# Patient Record
Sex: FEMALE | Race: WHITE | NOT HISPANIC OR LATINO | Employment: OTHER | URBAN - METROPOLITAN AREA
[De-identification: names, ages, dates, MRNs, and addresses within clinical notes are randomized per-mention and may not be internally consistent; named-entity substitution may affect disease eponyms.]

---

## 2017-02-21 ENCOUNTER — OFFICE VISIT (OUTPATIENT)
Dept: MEDICAL GROUP | Facility: MEDICAL CENTER | Age: 37
End: 2017-02-21

## 2017-02-21 VITALS
WEIGHT: 154.1 LBS | DIASTOLIC BLOOD PRESSURE: 64 MMHG | HEART RATE: 86 BPM | BODY MASS INDEX: 24.77 KG/M2 | RESPIRATION RATE: 16 BRPM | OXYGEN SATURATION: 96 % | HEIGHT: 66 IN | SYSTOLIC BLOOD PRESSURE: 96 MMHG | TEMPERATURE: 98.2 F

## 2017-02-21 DIAGNOSIS — R53.83 FATIGUE, UNSPECIFIED TYPE: ICD-10-CM

## 2017-02-21 DIAGNOSIS — F41.9 ANXIETY AND DEPRESSION: ICD-10-CM

## 2017-02-21 DIAGNOSIS — R00.2 PALPITATIONS: ICD-10-CM

## 2017-02-21 DIAGNOSIS — F32.A ANXIETY AND DEPRESSION: ICD-10-CM

## 2017-02-21 PROCEDURE — 99204 OFFICE O/P NEW MOD 45 MIN: CPT | Performed by: PHYSICIAN ASSISTANT

## 2017-02-21 RX ORDER — PROPRANOLOL HYDROCHLORIDE 10 MG/1
TABLET ORAL
Qty: 90 TAB | Refills: 5 | Status: SHIPPED | OUTPATIENT
Start: 2017-02-21

## 2017-02-21 RX ORDER — VENLAFAXINE 37.5 MG/1
37.5 TABLET ORAL 2 TIMES DAILY
Qty: 60 TAB | Refills: 2 | Status: SHIPPED | OUTPATIENT
Start: 2017-02-21 | End: 2017-05-19 | Stop reason: SDUPTHER

## 2017-02-21 RX ORDER — PROPRANOLOL HYDROCHLORIDE 20 MG/1
20 TABLET ORAL 3 TIMES DAILY
COMMUNITY

## 2017-02-21 RX ORDER — ESCITALOPRAM OXALATE 10 MG/1
10 TABLET ORAL DAILY
COMMUNITY

## 2017-02-21 RX ORDER — PROPRANOLOL HYDROCHLORIDE 10 MG/1
10 TABLET ORAL 3 TIMES DAILY
COMMUNITY
End: 2017-02-21 | Stop reason: SDUPTHER

## 2017-02-21 RX ORDER — VENLAFAXINE HYDROCHLORIDE 37.5 MG/1
37.5 CAPSULE, EXTENDED RELEASE ORAL DAILY
Qty: 30 CAP | Refills: 3 | Status: CANCELLED | OUTPATIENT
Start: 2017-02-21

## 2017-02-21 ASSESSMENT — PATIENT HEALTH QUESTIONNAIRE - PHQ9
SUM OF ALL RESPONSES TO PHQ QUESTIONS 1-9: 17
CLINICAL INTERPRETATION OF PHQ2 SCORE: 6
5. POOR APPETITE OR OVEREATING: 0 - NOT AT ALL

## 2017-02-21 NOTE — PROGRESS NOTES
Chief Complaint   Patient presents with   • New Patient   • Depression     HISTORY OF THE PRESENT ILLNESS: This is a 36 y.o. female new patient to our practice. This pleasant patient is here today to establish care.     Anxiety and depression  Patient states that this initially started approximately 9 years ago after the birth of her first child. Patient states that she had postpartum depression after the birth of her son and was on Lexapro for approximately 1.5 years. Patient states that she was able to gain control and therefore's discontinue the medication. Patient states she was doing fairly stable and controlled up until approximately 1+ year after the birth of her daughter 5 years ago where she started experiencing postpartum depression yet again. As a result patient went back on Lexapro and did take For less than one year. Patient states that she did wean herself off. Patient states that she was doing a lot of natural herbal supplements such as Seth's wort's, 5 HTP, ELIO-E, etc. Patient states that she was doing well and controlled up until approximately last summer. 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 Patient states that she noticed in the summertime severity of her anxiety, panic attacks. Patient states that she's been experiencing increased panic attacks, palpitations, reflux, and fatigue. Patient states while in [place] for work she does see a primary care out there who did start patient back on Lexapro 10 mg. Patient states anxiety also severe.  With anxiety had elevated HR.  PCP in [place] prescribed for elevated heart rate/anxiety propanolol. Patient was taking 10 mg in the morning if necessary as needed as well as 20 mg at nighttime, once again as needed. Patient states it does help with sleep.  Patient has tried increasing Lexapro to 20 which causes severe side effects.      Past Medical History    Diagnosis Date   • AR (allergic rhinitis)    • Anxiety and depression        Past Surgical History   Procedure Laterality Date   • Appendectomy     • Primary c section  2007   • Cyst excision     • Primary c section  2011   • Salpingo-oophorectomy, unilateral     • Hernia repair  03/2015     [facility]   • Vein ligation radio frequency       No family status information on file.     Family History   Problem Relation Age of Onset   • Anxiety disorder Mother    • Depression Mother      Social History   Substance Use Topics   • Smoking status: Former Smoker   • Smokeless tobacco: Never Used   • Alcohol Use: 0.0 oz/week     0 Standard drinks or equivalent per week      Comment: Occasionally - weekends     Allergies: Penicillins    Current Outpatient Prescriptions Ordered in [facility]   Medication Sig Dispense Refill   • escitalopram (LEXAPRO) 10 MG Tab Take 10 mg by mouth every day.     • propranolol (INDERAL) 20 MG Tab Take 20 mg by mouth 3 times a day.     • propranolol (INDERAL) 10 MG Tab i po qam and ii po qhs prn anxiety 90 Tab 5   • venlafaxine (EFFEXOR) 37.5 MG Tab Take 1 Tab by mouth 2 Times a Day. 60 Tab 2     No current Epic-ordered facility-administered medications on file.     Review of Systems   Constitutional: Negative for fever, chills, weight loss and positive malaise/fatigue.   HENT: Negative for ear pain, nosebleeds, congestion, sore throat and neck pain.    Eyes: Negative for blurred vision.   Respiratory: Negative for cough, sputum production, shortness of breath and wheezing.    Cardiovascular: Negative for chest pain.  Positive palpitations.  Negative orthopnea and leg swelling.   Gastrointestinal: Negative for heartburn, nausea, vomiting and abdominal pain.   Genitourinary: Negative for dysuria, urgency and frequency.   Musculoskeletal: Negative for myalgias, back pain and joint pain.   Skin: Negative for rash and itching.   Neurological: Negative for dizziness, tingling, tremors, sensory  "change, focal weakness and headaches.   Endo/Heme/Allergies: Does not bruise/bleed easily.   Psychiatric/Behavioral: Positive for depression, anxiety.  Negative memory loss.     All other systems reviewed and are negative except as in HPI.    Exam: Blood pressure 96/64, pulse 86, temperature 36.8 °C (98.2 °F), resp. rate 16, height 1.676 m (5' 5.98\"), weight 69.9 kg (154 lb 1.6 oz), SpO2 96 %, not currently breastfeeding.  General: Normal appearing. No distress.  HEENT: Normocephalic. Eyes conjunctiva clear lids without ptosis, pupils equal and reactive to light accommodation, ears normal shape and contour, canals are clear bilaterally, tympanic membranes are benign, nasal mucosa benign, oropharynx is without erythema, edema or exudates.   Neck: Supple without JVD or bruit. Thyroid is not enlarged.  Pulmonary: Clear to ausculation.  Normal effort. No rales, ronchi, or wheezing.  Cardiovascular: Regular rate and rhythm without murmur. Carotid and radial pulses are intact and equal bilaterally.  Abdomen: Soft, nontender, nondistended. Normal bowel sounds. Liver and spleen are not palpable  Neurologic: Grossly nonfocal  Lymph: No cervical, supraclavicular or axillary lymph nodes are palpable  Skin: Warm and dry.  No obvious lesions.  Musculoskeletal: Normal gait. No extremity cyanosis, clubbing, or edema.  Psych: Slightly flat/depressed mood and affect. Alert and oriented x3. Judgment and insight is normal.    Medical decision-making and discussion: 36-year-old female presents to clinic is new patient with past mental history of anxiety/depression as well as palpitations and fatigue. We discussed there is differential diagnoses to include cardiac, thyroid, anemia, etc. We are going to order a Holter monitor, labs and start patient on venlafaxine 37.5 mg twice a day. Refill on propanolol also given to patient as well. Reevaluation one month    Please note that this dictation was created using voice recognition software. " I have made every reasonable attempt to correct obvious errors, but I expect that there are errors of grammar and possibly content that I did not discover before finalizing the note.      Assessment/Plan  1. Anxiety and depression  Patient has been identified as being depressed and appropriate orders and counseling have been given    propranolol (INDERAL) 10 MG Tab    HOLTER MONITOR STUDY    venlafaxine (EFFEXOR) 37.5 MG Tab   2. Palpitations  TSH    HOLTER MONITOR STUDY    COMP METABOLIC PANEL    venlafaxine (EFFEXOR) 37.5 MG Tab   3. Fatigue, unspecified type  TSH    HOLTER MONITOR STUDY    COMP METABOLIC PANEL    CBC WITH DIFFERENTIAL    VITAMIN D,25 HYDROXY    VITAMIN B12

## 2017-02-21 NOTE — ASSESSMENT & PLAN NOTE
Patient states that this initially started approximately 9 years ago after the birth of her first child. Patient states that she had postpartum depression after the birth of her son and was on Lexapro for approximately 1.5 years. Patient states that she was able to gain control and therefore's discontinue the medication. Patient states she was doing fairly stable and controlled up until approximately 1+ year after the birth of her daughter 5 years ago where she started experiencing postpartum depression yet again. As a result patient went back on Lexapro and did take For less than one year. Patient states that she did wean herself off. Patient states that she was doing a lot of natural herbal supplements such as [place]'s wort's, 5 HTP, ELIO-E, etc. Patient states that she was doing well and controlled up until approximately last summer. 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 Patient states that she noticed in the summertime severity of her anxiety, panic attacks. Patient states that she's been experiencing increased panic attacks, palpitations, reflux, and fatigue. Patient states while in [place] for work she does see a primary care out there who did start patient back on Lexapro 10 mg. Patient states anxiety also severe.  With anxiety had elevated HR.  PCP in [place] prescribed for elevated heart rate/anxiety propanolol. Patient was taking 10 mg in the morning if necessary as needed as well as 20 mg at nighttime, once again as needed. Patient states it does help with sleep.  Patient has tried increasing Lexapro to 20 which causes severe side effects.

## 2017-03-13 ENCOUNTER — TELEPHONE (OUTPATIENT)
Dept: MEDICAL GROUP | Facility: MEDICAL CENTER | Age: 37
End: 2017-03-13

## 2017-03-13 NOTE — TELEPHONE ENCOUNTER
Phone Number Called: [phone]     Message: I was returning patient's phone call. - Left voicemail for patient to call us back.     Left Message for patient to call back: yes

## 2017-03-15 NOTE — TELEPHONE ENCOUNTER
Phone Number Called: [phone] (home)     Message: Called patient and she states she had her labs drawn at the cash lab off of [facility] and was wondering if we received the results yet. I looked in the chart and we do not have them and informed the patient that we will call the lab and have them fax them over to us. Patient stated understanding.    Left Message for patient to call back: N\A

## 2017-08-14 RX ORDER — VENLAFAXINE 37.5 MG/1
TABLET ORAL
Qty: 60 TAB | Refills: 0 | Status: SHIPPED | OUTPATIENT
Start: 2017-08-14

## 2017-08-14 NOTE — TELEPHONE ENCOUNTER
Was the patient seen in the last year in this department? Yes     Does patient have an active prescription for medications requested? Yes     Received Request Via: Pharmacy     Last office visit: 02/21/2017  Last labs: NONE

## 2018-06-12 ENCOUNTER — TELEPHONE (OUTPATIENT)
Dept: VASCULAR LAB | Facility: MEDICAL CENTER | Age: 38
End: 2018-06-12

## 2018-06-12 NOTE — TELEPHONE ENCOUNTER
Called patient to schedule initial vascular appointment with Dr. Garcia. Spoke with patient who declined to schedule saying they d not believe it is necessary but will call to schedule if something changes.    Jesse Chance, Med. [facility]  [facility]